# Patient Record
(demographics unavailable — no encounter records)

---

## 2024-12-03 NOTE — PHYSICAL EXAM
[No Acute Distress] : no acute distress [Well Nourished] : well nourished [Well Developed] : well developed [Well-Appearing] : well-appearing [Normal Sclera/Conjunctiva] : normal sclera/conjunctiva [PERRL] : pupils equal round and reactive to light [EOMI] : extraocular movements intact [Normal Outer Ear/Nose] : the outer ears and nose were normal in appearance [Normal Oropharynx] : the oropharynx was normal [No JVD] : no jugular venous distention [No Lymphadenopathy] : no lymphadenopathy [Supple] : supple [Thyroid Normal, No Nodules] : the thyroid was normal and there were no nodules present [No Respiratory Distress] : no respiratory distress  [No Accessory Muscle Use] : no accessory muscle use [Clear to Auscultation] : lungs were clear to auscultation bilaterally [Normal Rate] : normal rate  [Regular Rhythm] : with a regular rhythm [Normal S1, S2] : normal S1 and S2 [No Murmur] : no murmur heard [Pedal Pulses Present] : the pedal pulses are present [Soft] : abdomen soft [Non Tender] : non-tender [Non-distended] : non-distended [Normal Posterior Cervical Nodes] : no posterior cervical lymphadenopathy [Normal Anterior Cervical Nodes] : no anterior cervical lymphadenopathy [No Joint Swelling] : no joint swelling [Grossly Normal Strength/Tone] : grossly normal strength/tone [No Rash] : no rash [Coordination Grossly Intact] : coordination grossly intact [No Focal Deficits] : no focal deficits [Normal Gait] : normal gait [Normal Affect] : the affect was normal [Normal Insight/Judgement] : insight and judgment were intact [No Edema] : there was no peripheral edema [de-identified] : Normotensive  BMI: 27.12

## 2024-12-03 NOTE — PHYSICAL EXAM
[No Acute Distress] : no acute distress [Well Nourished] : well nourished [Well Developed] : well developed [Well-Appearing] : well-appearing [Normal Sclera/Conjunctiva] : normal sclera/conjunctiva [PERRL] : pupils equal round and reactive to light [EOMI] : extraocular movements intact [Normal Outer Ear/Nose] : the outer ears and nose were normal in appearance [Normal Oropharynx] : the oropharynx was normal [No JVD] : no jugular venous distention [No Lymphadenopathy] : no lymphadenopathy [Supple] : supple [Thyroid Normal, No Nodules] : the thyroid was normal and there were no nodules present [No Respiratory Distress] : no respiratory distress  [No Accessory Muscle Use] : no accessory muscle use [Clear to Auscultation] : lungs were clear to auscultation bilaterally [Normal Rate] : normal rate  [Regular Rhythm] : with a regular rhythm [Normal S1, S2] : normal S1 and S2 [No Murmur] : no murmur heard [Pedal Pulses Present] : the pedal pulses are present [Soft] : abdomen soft [Non Tender] : non-tender [Non-distended] : non-distended [Normal Posterior Cervical Nodes] : no posterior cervical lymphadenopathy [Normal Anterior Cervical Nodes] : no anterior cervical lymphadenopathy [No Joint Swelling] : no joint swelling [Grossly Normal Strength/Tone] : grossly normal strength/tone [No Rash] : no rash [Coordination Grossly Intact] : coordination grossly intact [No Focal Deficits] : no focal deficits [Normal Gait] : normal gait [Normal Affect] : the affect was normal [Normal Insight/Judgement] : insight and judgment were intact [No Edema] : there was no peripheral edema [de-identified] : Normotensive  BMI: 27.12

## 2024-12-03 NOTE — HISTORY OF PRESENT ILLNESS
[FreeTextEntry1] : Annual Wellness examination [de-identified] : 42 yrs old male with history of  hyperlipidemia- last LDL  Barretts esophagitis ?in remission, herniated lumbar disc, here for annual wellness examination.  Last endoscopy about 5 yrs ago - states was good report with Dr. Lorenzo who retired. Main complaint is pain in right hip, right leg and knee- more difficulty playing sports due to pains. Patient is concerned about bone loss- was on omeprazole for 15 yrs + Also, more bone/muscle pains and fracture finger. Patient had recent calcium score 0 23, prior cholesterol 261,  EKG today is normal Has elevated cholesterol:  PSH: No surgery PMH: "Asthma" with lung infections Barretts esophagitis- hx ?in remission OA right hip bone on bone Dislocated shoulder over 10 yrs ago right shoulder NKDA Medication: Omeprazole 20 mg QD Vitamins Multi, Glucosamine/chondroitin, turmeric, MSM SH: Nonsmoker, Social alcohol, no marijuana FH: Father prostate cancer 50s, bladder cancer 70s alive 87.  Mother hyperlipidemia, osteoporosis 80.  Paternal uncle esophageal cancer  60s   New:  Paternal uncle 72 colon cancer stage III. Vaccines: Flu shot today- no other recent vaccines DIet: Healthy Exercise: sports and daily walking daily    Preventive screening:    Urology: Not yet  CRC screening: Not yet  Ophthalmology: No recent  Labwork: Today  Cardiac: none  Need for lung cancer screening/smoker 20 pack yrs/50+/smoker within 15 yrs: Neg  Dental: every 6 mos  Dermatology: No recent  Fall risk: No  Advanced directives : no  Bone density: At risk on Omeprazole over 15 yrs, fracture, bone/muscle pains

## 2024-12-03 NOTE — HISTORY OF PRESENT ILLNESS
[FreeTextEntry1] : Annual Wellness examination [de-identified] : 42 yrs old male with history of  hyperlipidemia- last LDL  Barretts esophagitis ?in remission, herniated lumbar disc, here for annual wellness examination.  Last endoscopy about 5 yrs ago - states was good report with Dr. Lorenzo who retired. Main complaint is pain in right hip, right leg and knee- more difficulty playing sports due to pains. Patient is concerned about bone loss- was on omeprazole for 15 yrs + Also, more bone/muscle pains and fracture finger. Patient had recent calcium score 0 23, prior cholesterol 261,  EKG today is normal Has elevated cholesterol:  PSH: No surgery PMH: "Asthma" with lung infections Barretts esophagitis- hx ?in remission OA right hip bone on bone Dislocated shoulder over 10 yrs ago right shoulder NKDA Medication: Omeprazole 20 mg QD Vitamins Multi, Glucosamine/chondroitin, turmeric, MSM SH: Nonsmoker, Social alcohol, no marijuana FH: Father prostate cancer 50s, bladder cancer 70s alive 87.  Mother hyperlipidemia, osteoporosis 80.  Paternal uncle esophageal cancer  60s   New:  Paternal uncle 72 colon cancer stage III. Vaccines: Flu shot today- no other recent vaccines DIet: Healthy Exercise: sports and daily walking daily    Preventive screening:    Urology: Not yet  CRC screening: Not yet  Ophthalmology: No recent  Labwork: Today  Cardiac: none  Need for lung cancer screening/smoker 20 pack yrs/50+/smoker within 15 yrs: Neg  Dental: every 6 mos  Dermatology: No recent  Fall risk: No  Advanced directives : no  Bone density: At risk on Omeprazole over 15 yrs, fracture, bone/muscle pains

## 2024-12-03 NOTE — PLAN
[FreeTextEntry1] : 42 years old male here for annual wellness examination. Patient with history of Drew's esophagitis, hyperlipidemia, right hip OA, fracture left finger, herniated disc, concerned about bone loss as was taking omeprazole for many years and has bone/muscle pain right hip/thigh and knee, as well as prior fracture finger and strong FH osteoporosis.   Recommend: Bone density for above reasons. Recommend: Low-cholesterol diet for hyperlipidemia, with last .  LDL goal under 100.  Calcium score 12/2023 is 0. EKG normal today, normotensive. GI consult for followup of Barretts and discuss when to start colonoscopy- has FH colon cancer Preventive screening discussed with patient-recommend eye exam, CRC screening at 45 years old or sooner, as family history for colon cancer,  PSA level discussed with patient-ordered due to age and family history of prostate cancer. Lab work ordered PSA, CBC, CMP, lipid, TFTs, LFTs, A1c, magnesium, CK, urine screen, vitamin levels. Med and vitamin reconciliation done Flu shot administered today Vaccines discussed Healthy diet with a lot of cardio. RV 1 year Patient understands instructions and agrees with plan Will contact patient with results of testing